# Patient Record
Sex: FEMALE | Race: WHITE | Employment: OTHER | URBAN - METROPOLITAN AREA
[De-identification: names, ages, dates, MRNs, and addresses within clinical notes are randomized per-mention and may not be internally consistent; named-entity substitution may affect disease eponyms.]

---

## 2017-02-13 RX ORDER — PRAVASTATIN SODIUM 10 MG/1
TABLET ORAL
Qty: 90 TAB | Refills: 1 | Status: SHIPPED | OUTPATIENT
Start: 2017-02-13 | End: 2017-05-16 | Stop reason: SDUPTHER

## 2017-05-08 RX ORDER — LEVOTHYROXINE SODIUM 75 UG/1
TABLET ORAL
Qty: 80 TAB | Refills: 1 | Status: SHIPPED | OUTPATIENT
Start: 2017-05-08 | End: 2017-08-08 | Stop reason: SDUPTHER

## 2017-05-16 RX ORDER — PRAVASTATIN SODIUM 10 MG/1
TABLET ORAL
Qty: 90 TAB | Refills: 1 | Status: SHIPPED | OUTPATIENT
Start: 2017-05-16 | End: 2017-12-05 | Stop reason: SDUPTHER

## 2017-06-07 ENCOUNTER — OFFICE VISIT (OUTPATIENT)
Dept: INTERNAL MEDICINE CLINIC | Age: 77
End: 2017-06-07

## 2017-06-07 ENCOUNTER — HOSPITAL ENCOUNTER (OUTPATIENT)
Dept: LAB | Age: 77
Discharge: HOME | End: 2017-06-07
Payer: MEDICARE

## 2017-06-07 VITALS
HEIGHT: 62 IN | SYSTOLIC BLOOD PRESSURE: 138 MMHG | HEART RATE: 63 BPM | WEIGHT: 112.25 LBS | TEMPERATURE: 98.1 F | RESPIRATION RATE: 18 BRPM | DIASTOLIC BLOOD PRESSURE: 83 MMHG | OXYGEN SATURATION: 98 % | BODY MASS INDEX: 20.66 KG/M2

## 2017-06-07 DIAGNOSIS — E03.4 HYPOTHYROIDISM DUE TO ACQUIRED ATROPHY OF THYROID: Primary | ICD-10-CM

## 2017-06-07 DIAGNOSIS — E78.5 HYPERLIPIDEMIA WITH TARGET LDL LESS THAN 130: ICD-10-CM

## 2017-06-07 PROCEDURE — 84443 ASSAY THYROID STIM HORMONE: CPT

## 2017-06-07 PROCEDURE — 36415 COLL VENOUS BLD VENIPUNCTURE: CPT

## 2017-06-07 NOTE — MR AVS SNAPSHOT
Visit Information Date & Time Provider Department Dept. Phone Encounter #  
 6/7/2017  8:45 AM Lynne Blake MD [facility] [address] [id number] Follow-up Instructions Return in about 6 months (around 12/7/2017). Upcoming Health Maintenance Date Due DTaP/Tdap/Td series (1 - Tdap) 6[birth year]6/1961 INFLUENZA AGE 9 TO ADULT 8/1/2017 GLAUCOMA SCREENING Q2Y 11/10/2017 MEDICARE YEARLY EXAM 12/7/2017 Allergies as of 6/7/2017  Review Complete On: 12/6/2016 By: Stephanie Olivarez LPN Severity Noted Reaction Type Reactions Aleve [Naproxen Sodium]  02/08/2013   Side Effect Other (comments) Welts on skin Iodine  11/04/2009    Other (comments) Loss of voice, burning sensations No IVP dye Pcn [Penicillins]  11/04/2009    Hives Sulfa (Sulfonamide Antibiotics)  11/04/2009    Other (comments) Bruising Tetracycline  07/29/2010   Intolerance Unknown (comments) Tylenol [Acetaminophen]  11/08/2013    Hives Hands were itchy and red. Current Immunizations  Reviewed on 12/8/2015 Name Date Influenza High Dose Vaccine PF 11/11/2016, 11/3/2015 Influenza Vaccine 11/13/2014, 11/5/2013 Influenza Vaccine (Whole Virus) 11/1/2012 Influenza Vaccine Split 10/15/2009 Pneumococcal Conjugate (PCV-13) 1/6/2017, 12/18/2015 Pneumococcal Vaccine (Unspecified Type) 11/8/2003, 1/30/2003 Zoster Vaccine, Live 11/8/2008 Not reviewed this visit You Were Diagnosed With   
  
 Codes Comments Hypothyroidism due to acquired atrophy of thyroid    -  Primary ICD-10-CM: E03.4 ICD-9-CM: 244.8, 246.8 Hyperlipidemia with target LDL less than 130     ICD-10-CM: E78.5 ICD-9-CM: 272.4 Vitals BP Pulse Temp Resp Height(growth percentile) Weight(growth percentile) 138/83 (BP 1 Location: Right arm, BP Patient Position: Sitting) 63 98.1 °F (36.7 °C) (Oral) 18 5' 2\" (1.575 m) 112 lb 4 oz (50.9 kg) LMP SpO2 BMI OB Status Smoking Status 01/01/1988 98% 20.53 kg/m2 Hysterectomy Never Smoker Vitals History BMI and BSA Data Body Mass Index Body Surface Area 20.53 kg/m 2 1.49 m 2 Preferred Pharmacy Pharmacy Name Phone [facility] [phone] [place] [phone] Your Updated Medication List  
  
   
This list is accurate as of: 6/7/17  9:08 AM.  Always use your most recent med list.  
  
  
  
  
 CALCIUM ACETATE PO Take  by mouth. cetirizine 10 mg tablet Commonly known as:  ZYRTEC Take 10 mg by mouth daily as needed. levothyroxine 75 mcg tablet Commonly known as:  SYNTHROID  
TAKE 1 TABLET BY MOUTH 6 DAYS A WEEK  
  
 pravastatin 10 mg tablet Commonly known as:  PRAVACHOL  
TAKE 1 TAB BY MOUTH DAILY. SYSTANE (PROPYLENE GLYCOL) 0.4-0.3 % Drop Generic drug:  peg 400-propylene glycol Apply 1 Drop to eye as needed. VITAMIN C 500 mg tablet Generic drug:  ascorbic acid (vitamin C) Take 500 mg by mouth. Taking half tablet daily VITAMIN D3 1,000 unit tablet Generic drug:  cholecalciferol Take  by mouth daily. We Performed the Following TSH 3RD GENERATION [47739 CPT(R)] Follow-up Instructions Return in about 6 months (around 12/7/2017). Introducing [facility]! [facility] introduces [employer]tech patient portal. Now you can access parts of your medical record, email your doctor's office, and request medication refills online. 1. In your internet browser, go to [email or web address] 2. Click on the First Time User? Click Here link in the Sign In box. You will see the New Member Sign Up page. 3. Enter your Buytech Access Code exactly as it appears below. You will not need to use this code after youve completed the sign-up process. If you do not sign up before the expiration date, you must request a new code. · Storspeed Access Code: [email or web address] Expires: 9/5/2017  9:08 AM 
 
4. Enter the last four digits of your Social Security Number (xxxx) and Date of Birth (mm/dd/yyyy) as indicated and click Submit. You will be taken to the next sign-up page. 5. Create a Storspeed ID. This will be your Storspeed login ID and cannot be changed, so think of one that is secure and easy to remember. 6. Create a Storspeed password. You can change your password at any time. 7. Enter your Password Reset Question and Answer. This can be used at a later time if you forget your password. 8. Enter your e-mail address. You will receive e-mail notification when new information is available in [address] 9. Click Sign Up. You can now view and download portions of your medical record. 10. Click the Download Summary menu link to download a portable copy of your medical information. If you have questions, please visit the Frequently Asked Questions section of the Storspeed website. Remember, Storspeed is NOT to be used for urgent needs. For medical emergencies, dial 911. Now available from your iPhone and Android! Please provide this summary of care documentation to your next provider. Your primary care clinician is listed as Vasyl Way. If you have any questions after today's visit, please call [phone].

## 2017-06-07 NOTE — PROGRESS NOTES
HISTORY OF PRESENT ILLNESS  Darek Torres is a 76 y.o. female. HPI  Thyroid Disease:  Darek Torres is a 76 y.o. female here for follow up of hypothyroidism. Lab Results   Component Value Date/Time    TSH 1.530 12/06/2016 09:43 AM     Residual symptoms denies fatigue, weight changes, heat/cold intolerance, bowel/skin changes or CVS symptoms. she denies denies fatigue, weight changes, heat/cold intolerance, bowel/skin changes or CVS symptoms  Thyroid medication has been unchanged since last medication check and labs. Hyperlipidemia:  Darek Torres is following up on her dyslipidemia. Cardiovascular risks for her are: LDL goal is under 100. Currently she takes Pravachol (pravastatin) ,   Lab Results   Component Value Date/Time    Cholesterol, total 160 12/06/2016 09:43 AM    Cholesterol, total 166 12/08/2015 09:23 AM    Cholesterol, total 200 12/04/2014 08:27 AM    Cholesterol, total 163 05/08/2014 08:17 AM    Cholesterol, total 140 11/08/2013 08:32 AM    HDL Cholesterol 51 12/06/2016 09:43 AM    HDL Cholesterol 51 12/08/2015 09:23 AM    HDL Cholesterol 49 12/04/2014 08:27 AM    HDL Cholesterol 48 05/08/2014 08:17 AM    HDL Cholesterol 46 11/08/2013 08:32 AM    LDL, calculated 90 12/06/2016 09:43 AM    LDL, calculated 102 12/08/2015 09:23 AM    LDL, calculated 123 12/04/2014 08:27 AM    LDL, calculated 96 05/08/2014 08:17 AM    LDL, calculated 72 11/08/2013 08:32 AM    Triglyceride 97 12/06/2016 09:43 AM    Triglyceride 63 12/08/2015 09:23 AM    Triglyceride 138 12/04/2014 08:27 AM    Triglyceride 97 05/08/2014 08:17 AM    Triglyceride 108 11/08/2013 08:32 AM    CHOL/HDL Ratio 4.8 05/05/2010 10:10 AM    CHOL/HDL Ratio 4.2 05/14/2009 08:40 AM     Lab Results   Component Value Date/Time    ALT (SGPT) 19 05/09/2013 12:00 AM    AST (SGOT) 20 05/09/2013 12:00 AM    Alk.  phosphatase 84 05/09/2013 12:00 AM    Bilirubin, direct 0.23 05/09/2013 12:00 AM    Bilirubin, total 1.0 05/09/2013 12:00 AM       Myalgias: no  Fatigue: no      She has seen Dr. Markos Ngo for recurring UTI's. Workup negative. Prescribed cranberry products which caused heartburn. She is taking tabs now without trouble. We discussed lack of clinical evidence behind this approach but ok to continue. Patient Active Problem List   Diagnosis Code    Hypothyroid E03.9    AR (allergic rhinitis) J30.9    Osteopenia M85.80    Colon polyp K63.5    Herpes simplex labialis B00.1    Hyperlipidemia with target LDL less than 130 E78.5    Anxiety F41.9    Osteoporosis M81.0    Dysplastic colon polyp K63.5    Advanced care planning/counseling discussion Z71.89    Abdominal pain R10.9     Past Medical History:   Diagnosis Date    AR (allergic rhinitis) 5/5/2010    Colon polyp 7/12/2010    Hyperlipidemia LDL goal < 130 2/6/2012    Hypothyroid 5/12/2009    Osteopenia 5/5/2010    Thyroid disease     Ureter filling defect     needed surgery     Allergies   Allergen Reactions    Aleve [Naproxen Sodium] Other (comments)     Welts on skin     Iodine Other (comments)     Loss of voice, burning sensations  No IVP dye     Pcn [Penicillins] Hives    Sulfa (Sulfonamide Antibiotics) Other (comments)     Bruising     Tetracycline Unknown (comments)    Tylenol [Acetaminophen] Hives     Hands were itchy and red. Current Outpatient Prescriptions on File Prior to Visit   Medication Sig Dispense Refill    pravastatin (PRAVACHOL) 10 mg tablet TAKE 1 TAB BY MOUTH DAILY. 90 Tab 1    levothyroxine (SYNTHROID) 75 mcg tablet TAKE 1 TABLET BY MOUTH 6 DAYS A WEEK 80 Tab 1    CALCIUM ACETATE PO Take  by mouth.  cetirizine (ZYRTEC) 10 mg tablet Take 10 mg by mouth daily as needed. 11    cholecalciferol (VITAMIN D3) 1,000 unit tablet Take  by mouth daily.  peg 400-propylene glycol (SYSTANE) 0.4-0.3 % Drop Apply 1 Drop to eye as needed.  ascorbic acid (VITAMIN C) 500 mg tablet Take 500 mg by mouth.  Taking half tablet daily       No current facility-administered medications on file prior to visit. Social History   Substance Use Topics    Smoking status: Never Smoker    Smokeless tobacco: Never Used    Alcohol use No             ROS    Physical Exam   Constitutional: She appears well-developed and well-nourished. No distress. /83 (BP 1 Location: Right arm, BP Patient Position: Sitting)  Pulse 63  Temp 98.1 °F (36.7 °C) (Oral)   Resp 18  Ht 5' 2\" (1.575 m)  Wt 112 lb 4 oz (50.9 kg)  LMP 01/01/1988  SpO2 98%  BMI 20.53 kg/m2Body mass index is 20.53 kg/(m^2). HENT:   Mouth/Throat: Oropharynx is clear and moist.   Neck: No JVD present. Carotid bruit is not present. Cardiovascular: Normal rate, regular rhythm, normal heart sounds and intact distal pulses. Pulmonary/Chest: Effort normal and breath sounds normal.   Musculoskeletal: She exhibits no edema. Neurological: She is alert. Skin: Skin is warm and dry. She is not diaphoretic. Nursing note and vitals reviewed. ASSESSMENT and PLAN  Meghan Galvan was seen today for thyroid problem. Diagnoses and all orders for this visit:    Hypothyroidism due to acquired atrophy of thyroid - Well controlled and stable. her medications were reviewed and refilled where necessary as noted below. Labs ordered as noted. -     TSH 3RD GENERATION    Hyperlipidemia with target LDL less than 130 -Well controlled and stable. her medications were reviewed and refilled where necessary as noted below. Labs ordered as noted. Recheck in 6 months. Follow-up Disposition:  Return in about 6 months (around 12/7/2017).

## 2017-06-08 LAB — TSH SERPL DL<=0.005 MIU/L-ACNC: 2.75 UIU/ML (ref 0.45–4.5)

## 2017-08-08 RX ORDER — LEVOTHYROXINE SODIUM 75 UG/1
TABLET ORAL
Qty: 80 TAB | Refills: 1 | Status: SHIPPED | OUTPATIENT
Start: 2017-08-08 | End: 2018-04-10 | Stop reason: SDUPTHER

## 2017-09-27 ENCOUNTER — OFFICE VISIT (OUTPATIENT)
Dept: INTERNAL MEDICINE CLINIC | Age: 77
End: 2017-09-27

## 2017-09-27 VITALS
HEIGHT: 62 IN | TEMPERATURE: 98 F | OXYGEN SATURATION: 99 % | RESPIRATION RATE: 12 BRPM | BODY MASS INDEX: 20.54 KG/M2 | WEIGHT: 111.6 LBS | SYSTOLIC BLOOD PRESSURE: 133 MMHG | DIASTOLIC BLOOD PRESSURE: 60 MMHG | HEART RATE: 65 BPM

## 2017-09-27 DIAGNOSIS — S69.92XA LEFT WRIST INJURY, INITIAL ENCOUNTER: Primary | ICD-10-CM

## 2017-09-27 DIAGNOSIS — M72.0 DUPUYTREN'S CONTRACTURE OF RIGHT HAND: ICD-10-CM

## 2017-09-27 DIAGNOSIS — S60.211A CONTUSION OF RIGHT WRIST, INITIAL ENCOUNTER: ICD-10-CM

## 2017-09-27 DIAGNOSIS — M81.0 AGE RELATED OSTEOPOROSIS, UNSPECIFIED PATHOLOGICAL FRACTURE PRESENCE: ICD-10-CM

## 2017-09-27 NOTE — PROGRESS NOTES
HISTORY OF PRESENT ILLNESS  Skyler Olivera is a 76 y.o. female. HPI  Presents with complaints of left wrist injury that occurred after she missed last step while descending her attic staircase on 9/21. [place] herself falling and she grabbed onto pole at end of steps and swung herself around, striking her left wrist.  Developed dark bruising and edema and she applied ice immediately after injury. Has developed persistent swelling over inner aspect of distal radius. Has been able to use her left hand with mild pain but has been mainly resting arm. History of Osteoporosis on DEXA scan in 2015. Also notes some thickening in palm of right hand at base of 5th finger that has been present for years. History of fracture to that finger when she was one. Has not noted any trouble flexing or extending right fifth finger. Review of Systems   Constitutional: Negative for chills, fever and malaise/fatigue. Respiratory: Negative for cough. Cardiovascular: Negative for chest pain and palpitations. Gastrointestinal: Negative for nausea and vomiting. Musculoskeletal: Positive for joint pain (left wrist). Neurological: Negative for dizziness and headaches. /60 (BP 1 Location: Right arm, BP Patient Position: Sitting)  Pulse 65  Temp 98 °F (36.7 °C) (Oral)   Resp 12  Ht 5' 2\" (1.575 m)  Wt 111 lb 9.6 oz (50.6 kg)  LMP 01/01/1988  SpO2 99%  BMI 20.41 kg/m2  Physical Exam   Constitutional: She is oriented to person, place, and time. She appears well-developed and well-nourished. HENT:   Head: Normocephalic and atraumatic. Cardiovascular: Normal rate and regular rhythm. Pulmonary/Chest: Effort normal and breath sounds normal.   Musculoskeletal:        Arms:       Hands:  Red area depicts contusion; black area soft tissue edema left wrist; right palm with small nodule at base of fifth finger with mild thickening of tendon    Neurological: She is alert and oriented to person, place, and time. Psychiatric: She has a normal mood and affect. Her behavior is normal.   Nursing note and vitals reviewed. ASSESSMENT and PLAN  Diagnoses and all orders for this visit:    1. Left wrist injury, initial encounter -- some soft tissue edema present; concern about possible bony injury due to history of Osteoporosis. Will obtain xray films  -     XR WRIST LT AP/LAT; Future    2. Contusion of right wrist, initial encounter    3. Age related osteoporosis, unspecified pathological fracture presence  -     XR WRIST LT AP/LAT; Future    4.  Dupuytren's contracture of right hand -- appears to be mild contracture; advised stretching and mild massage to area; advised her to contact office if she develops stiffness in right finger or difficulty with ROM      lab results and schedule of future lab studies reviewed with patient  reviewed diet, exercise and weight control  reviewed medications and side effects in detail

## 2017-09-27 NOTE — PATIENT INSTRUCTIONS
Dupuytren's Contracture: Care Instructions  Your Care Instructions    In Dupuytren's contracture, the fingers become stiff and curl toward the palm. It is caused by thick tissue that grows under the skin in the palm of the hand. Sometimes the condition affects the palm but not the fingers. If the tissue gets thicker and affects one or more fingers, it may limit movement of your fingers and hand. Sometimes the condition can occur in the soles of the feet. The cause of Dupuytren's disease is not known. Dupuytren's disease may get worse slowly. If you have mild Dupuytren's disease, you may be able to keep your fingers moving with regular stretching. Surgery usually helps in severe cases. However, Dupuytren's disease can come back. Follow-up care is a key part of your treatment and safety. Be sure to make and go to all appointments, and call your doctor if you are having problems. It's also a good idea to know your test results and keep a list of the medicines you take. How can you care for yourself at home? · Follow your doctor's advice for physical or occupational therapy and exercises to put your fingers and hand through a range of motion. · Two times a day, massage your hand and gently stretch the fingers back. This can get rid of tightness and help keep your fingers flexible. · Try to avoid curling your hand tightly. For example, use utensils and tools that have larger hand . When should you call for help? Call your doctor now or seek immediate medical care if:  · You have numbness in your fingers. · You have a wound or sore on your finger or palm. · Your hand or fingers get worse. Watch closely for changes in your health, and be sure to contact your doctor if you have any problems. Where can you learn more? Go to http://estephanie-magen.info/. Enter T888 in the search box to learn more about \"Dupuytren's Contracture: Care Instructions. \"  Current as of: March 21, 2017  Content Version: 11.3  © 7513-5593 [employer]. Care instructions adapted under license by [employer] (which disclaims liability or warranty for this information). If you have questions about a medical condition or this instruction, always ask your healthcare professional. [address] any warranty or liability for your use of this information.

## 2017-09-27 NOTE — MR AVS SNAPSHOT
Visit Information Date & Time Provider Department Dept. Phone Encounter #  
 9/27/2017  9:45 AM Darcy Gerber MD [facility] [address] [facility] [id number] Your Appointments 12/5/2017  8:00 AM  
ROUTINE CARE with Darcy Gerber MD  
[facility] [place] Melissa Maldonado) Appt Note: 6 month for thyroid eval and fasting labs [address] [facility] [id number] [phone]  
  
   
 [address] [facility] [address] Upcoming Health Maintenance Date Due DTaP/Tdap/Td series (1 - Tdap) 6/6/1961 INFLUENZA AGE 9 TO ADULT 8/1/2017 GLAUCOMA SCREENING Q2Y 11/10/2017 MEDICARE YEARLY EXAM 12/7/2017 Allergies as of 9/27/2017  Review Complete On: 9/27/2017 By: Dede Dwyer NP Severity Noted Reaction Type Reactions Aleve [Naproxen Sodium]  02/08/2013   Side Effect Other (comments) Welts on skin Iodine  11/04/2009    Other (comments) Loss of voice, burning sensations No IVP dye Pcn [Penicillins]  11/04/2009    Hives Sulfa (Sulfonamide Antibiotics)  11/04/2009    Other (comments) Bruising Tetracycline  07/29/2010   Intolerance Unknown (comments) Tylenol [Acetaminophen]  11/08/2013    Hives Hands were itchy and red. Current Immunizations  Reviewed on 12/8/2015 Name Date Influenza High Dose Vaccine PF 11/11/2016, 11/3/2015 Influenza Vaccine 11/13/2014, 11/5/2013 Influenza Vaccine (Whole Virus) 11/1/2012 Influenza Vaccine Split 10/15/2009 Pneumococcal Conjugate (PCV-13) 1/6/2017, 12/18/2015 Pneumococcal Vaccine (Unspecified Type) 11/8/2003 ZZZ-RETIRED (DO NOT USE) Pneumococcal Vaccine (Unspecified Type) 1/30/2003 Zoster Vaccine, Live 11/8/2008 Not reviewed this visit You Were Diagnosed With   
  
 Codes Comments Left wrist injury, initial encounter    -  Primary ICD-10-CM: M25.29PA ICD-9-CM: 959.3 Age related osteoporosis, unspecified pathological fracture presence     ICD-10-CM: M81.0 ICD-9-CM: 733.01 Dupuytren's contracture of right hand     ICD-10-CM: M72.0 ICD-9-CM: 728.6 Contusion of right wrist, initial encounter     ICD-10-CM: Q96.926F ICD-9-CM: 923.21 Vitals BP Pulse Temp Resp Height(growth percentile) Weight(growth percentile) 133/60 (BP 1 Location: Right arm, BP Patient Position: Sitting) 65 98 °F (36.7 °C) (Oral) 12 5' 2\" (1.575 m) 111 lb 9.6 oz (50.6 kg) LMP SpO2 BMI OB Status Smoking Status 01/01/1988 99% 20.41 kg/m2 Hysterectomy Never Smoker BMI and BSA Data Body Mass Index Body Surface Area  
 20.41 kg/m 2 1.49 m 2 Preferred Pharmacy Pharmacy Name Phone [phone] [facility] [phone] Your Updated Medication List  
  
   
This list is accurate as of: 9/27/17  9:58 AM.  Always use your most recent med list.  
  
  
  
  
 BABY ASPIRIN PO Take  by mouth. CALCIUM ACETATE PO Take  by mouth. cetirizine 10 mg tablet Commonly known as:  ZYRTEC Take 10 mg by mouth daily as needed. levothyroxine 75 mcg tablet Commonly known as:  SYNTHROID  
TAKE 1 TABLET BY MOUTH 6 DAYS A WEEK  
  
 pravastatin 10 mg tablet Commonly known as:  PRAVACHOL  
TAKE 1 TAB BY MOUTH DAILY. SYSTANE (PROPYLENE GLYCOL) 0.4-0.3 % Drop Generic drug:  peg 400-propylene glycol Apply 1 Drop to eye as needed. VITAMIN C 500 mg tablet Generic drug:  ascorbic acid (vitamin C) Take 500 mg by mouth. Taking half tablet daily VITAMIN D3 1,000 unit tablet Generic drug:  cholecalciferol Take  by mouth daily. To-Do List   
 09/27/2017 Imaging:  XR WRIST LT AP/LAT Patient Instructions Dupuytren's Contracture: Care Instructions Your Care Instructions In Dupuytren's contracture, the fingers become stiff and curl toward the palm. It is caused by thick tissue that grows under the skin in the palm of the hand. Sometimes the condition affects the palm but not the fingers. If the tissue gets thicker and affects one or more fingers, it may limit movement of your fingers and hand. Sometimes the condition can occur in the soles of the feet. The cause of Dupuytren's disease is not known. Dupuytren's disease may get worse slowly. If you have mild Dupuytren's disease, you may be able to keep your fingers moving with regular stretching. Surgery usually helps in severe cases. However, Dupuytren's disease can come back. Follow-up care is a key part of your treatment and safety. Be sure to make and go to all appointments, and call your doctor if you are having problems. It's also a good idea to know your test results and keep a list of the medicines you take. How can you care for yourself at home? · Follow your doctor's advice for physical or occupational therapy and exercises to put your fingers and hand through a range of motion. · Two times a day, massage your hand and gently stretch the fingers back. This can get rid of tightness and help keep your fingers flexible. · Try to avoid curling your hand tightly. For example, use utensils and tools that have larger hand . When should you call for help? Call your doctor now or seek immediate medical care if: 
· You have numbness in your fingers. · You have a wound or sore on your finger or palm. · Your hand or fingers get worse. Watch closely for changes in your health, and be sure to contact your doctor if you have any problems. Where can you learn more? Go to http://estephanie-magen.info/. Enter T888 in the search box to learn more about \"Dupuytren's Contracture: Care Instructions. \" Current as of: March 21, 2017 Content Version: 11.3 © 7481-3431 [employer].  Care instructions adapted under license by [address] (which disclaims liability or warranty for this information). If you have questions about a medical condition or this instruction, always ask your healthcare professional. [address] any warranty or liability for your use of this information. Introducing [facility]! Errol Owens introduces [employer] patient portal. Now you can access parts of your medical record, email your doctor's office, and request medication refills online. 1. In your internet browser, go to [email or web address] 2. Click on the First Time User? Click Here link in the Sign In box. You will see the New Member Sign Up page. 3. Enter your RunRev Access Code exactly as it appears below. You will not need to use this code after youve completed the sign-up process. If you do not sign up before the expiration date, you must request a new code. · RunRev Access Code: [email or web address] Expires: 12/26/2017  9:56 AM 
 
4. Enter the last four digits of your Social Security Number (xxxx) and Date of Birth (mm/dd/yyyy) as indicated and click Submit. You will be taken to the next sign-up page. 5. Create a RunRev ID. This will be your RunRev login ID and cannot be changed, so think of one that is secure and easy to remember. 6. Create a [employer]Rev password. You can change your password at any time. 7. Enter your Password Reset Question and Answer. This can be used at a later time if you forget your password. 8. Enter your e-mail address. You will receive e-mail notification when new information is available in [address] 9. Click Sign Up. You can now view and download portions of your medical record. 10. Click the Download Summary menu link to download a portable copy of your medical information. If you have questions, please visit the Frequently Asked Questions section of the [employer] website.  Remember, [employer] is NOT to be used for urgent needs. For medical emergencies, dial 911. Now available from your iPhone and Android! Please provide this summary of care documentation to your next provider. Your primary care clinician is listed as Adithya Martinez. If you have any questions after today's visit, please call [phone].

## 2017-10-01 ENCOUNTER — HOSPITAL ENCOUNTER (OUTPATIENT)
Dept: GENERAL RADIOLOGY | Age: 77
Discharge: HOME | End: 2017-10-01
Payer: MEDICARE

## 2017-10-01 DIAGNOSIS — M81.0 AGE RELATED OSTEOPOROSIS, UNSPECIFIED PATHOLOGICAL FRACTURE PRESENCE: ICD-10-CM

## 2017-10-01 DIAGNOSIS — S69.92XA LEFT WRIST INJURY, INITIAL ENCOUNTER: ICD-10-CM

## 2017-10-01 PROCEDURE — 73110 X-RAY EXAM OF WRIST: CPT

## 2017-10-26 ENCOUNTER — TELEPHONE (OUTPATIENT)
Dept: INTERNAL MEDICINE CLINIC | Age: 77
End: 2017-10-26

## 2017-12-05 ENCOUNTER — HOSPITAL ENCOUNTER (OUTPATIENT)
Dept: LAB | Age: 77
Discharge: HOME | End: 2017-12-05
Payer: MEDICARE

## 2017-12-05 ENCOUNTER — OFFICE VISIT (OUTPATIENT)
Dept: INTERNAL MEDICINE CLINIC | Age: 77
End: 2017-12-05

## 2017-12-05 VITALS
HEART RATE: 82 BPM | OXYGEN SATURATION: 97 % | RESPIRATION RATE: 17 BRPM | TEMPERATURE: 98 F | WEIGHT: 109 LBS | BODY MASS INDEX: 20.06 KG/M2 | HEIGHT: 62 IN | SYSTOLIC BLOOD PRESSURE: 148 MMHG | DIASTOLIC BLOOD PRESSURE: 76 MMHG

## 2017-12-05 DIAGNOSIS — E03.4 HYPOTHYROIDISM DUE TO ACQUIRED ATROPHY OF THYROID: ICD-10-CM

## 2017-12-05 DIAGNOSIS — J06.9 VIRAL UPPER RESPIRATORY TRACT INFECTION: ICD-10-CM

## 2017-12-05 DIAGNOSIS — E78.5 HYPERLIPIDEMIA WITH TARGET LDL LESS THAN 130: Primary | ICD-10-CM

## 2017-12-05 PROCEDURE — 36415 COLL VENOUS BLD VENIPUNCTURE: CPT

## 2017-12-05 PROCEDURE — 84443 ASSAY THYROID STIM HORMONE: CPT

## 2017-12-05 PROCEDURE — 80061 LIPID PANEL: CPT

## 2017-12-05 RX ORDER — PRAVASTATIN SODIUM 10 MG/1
TABLET ORAL
Qty: 90 TAB | Refills: 1 | Status: SHIPPED | OUTPATIENT
Start: 2017-12-05 | End: 2018-08-07 | Stop reason: SDUPTHER

## 2017-12-05 NOTE — PROGRESS NOTES
HISTORY OF PRESENT ILLNESS  Haley Dunn is a 76 y.o. female. HPI  Upper respiratory illness:  Haley Dunn presents with complaints of coryza and productive cough for 6 days. no nausea and no vomiting . she has not had  congestion, sore throat, post nasal drip, night sweats, fever and chills. Symptoms are mild. Over-the-counter remedies including robitussin DM   has been used with good relief of symptoms. Drinking plenty of fluids: yes  Asthma?:  no  non-smoker  Contacts with similar infections: no       Hyperlipidemia:  Haley Dunn is following up on her dyslipidemia. Cardiovascular risks for her are: LDL goal is under 100. Currently she takes Pravachol (pravastatin) ,   Lab Results   Component Value Date/Time    Cholesterol, total 160 12/06/2016 09:43 AM    Cholesterol, total 166 12/08/2015 09:23 AM    Cholesterol, total 200 12/04/2014 08:27 AM    Cholesterol, total 163 05/08/2014 08:17 AM    Cholesterol, total 140 11/08/2013 08:32 AM    HDL Cholesterol 51 12/06/2016 09:43 AM    HDL Cholesterol 51 12/08/2015 09:23 AM    HDL Cholesterol 49 12/04/2014 08:27 AM    HDL Cholesterol 48 05/08/2014 08:17 AM    HDL Cholesterol 46 11/08/2013 08:32 AM    LDL, calculated 90 12/06/2016 09:43 AM    LDL, calculated 102 12/08/2015 09:23 AM    LDL, calculated 123 12/04/2014 08:27 AM    LDL, calculated 96 05/08/2014 08:17 AM    LDL, calculated 72 11/08/2013 08:32 AM    Triglyceride 97 12/06/2016 09:43 AM    Triglyceride 63 12/08/2015 09:23 AM    Triglyceride 138 12/04/2014 08:27 AM    Triglyceride 97 05/08/2014 08:17 AM    Triglyceride 108 11/08/2013 08:32 AM    CHOL/HDL Ratio 4.8 05/05/2010 10:10 AM    CHOL/HDL Ratio 4.2 05/14/2009 08:40 AM     Lab Results   Component Value Date/Time    ALT (SGPT) 19 05/09/2013 12:00 AM    AST (SGOT) 20 05/09/2013 12:00 AM    Alk.  phosphatase 84 05/09/2013 12:00 AM    Bilirubin, direct 0.23 05/09/2013 12:00 AM    Bilirubin, total 1.0 05/09/2013 12:00 AM       Myalgias: no  Fatigue: no        Thyroid Disease:  Kinjal Price is a 76 y.o. female here for follow up of hypothyroidism. Lab Results   Component Value Date/Time    TSH 2.750 06/07/2017 09:22 AM     Residual symptoms constipation. she denies denies fatigue, weight changes, heat/cold intolerance, /skin changes or CVS symptoms  Thyroid medication has been unchanged since last medication check and labs. Patient Active Problem List   Diagnosis Code    Hypothyroid E03.9    AR (allergic rhinitis) J30.9    Osteopenia M85.80    Colon polyp K63.5    Herpes simplex labialis B00.1    Hyperlipidemia with target LDL less than 130 E78.5    Anxiety F41.9    Osteoporosis M81.0    Dysplastic colon polyp K63.5    Advanced care planning/counseling discussion Z71.89    Abdominal pain R10.9     Past Medical History:   Diagnosis Date    AR (allergic rhinitis) 5/5/2010    Colon polyp 7/12/2010    Hyperlipidemia LDL goal < 130 2/6/2012    Hypothyroid 5/12/2009    Osteopenia 5/5/2010    Thyroid disease     Ureter filling defect     needed surgery     Allergies   Allergen Reactions    Aleve [Naproxen Sodium] Other (comments)     Welts on skin     Iodine Other (comments)     Loss of voice, burning sensations  No IVP dye     Pcn [Penicillins] Hives    Sulfa (Sulfonamide Antibiotics) Other (comments)     Bruising     Tetracycline Unknown (comments)    Tylenol [Acetaminophen] Hives     Hands were itchy and red. Current Outpatient Prescriptions on File Prior to Visit   Medication Sig Dispense Refill    BABY ASPIRIN PO Take  by mouth.  levothyroxine (SYNTHROID) 75 mcg tablet TAKE 1 TABLET BY MOUTH 6 DAYS A WEEK 80 Tab 1    CALCIUM ACETATE PO Take  by mouth.  cetirizine (ZYRTEC) 10 mg tablet Take 10 mg by mouth daily as needed. 11    cholecalciferol (VITAMIN D3) 1,000 unit tablet Take  by mouth daily.  peg 400-propylene glycol (SYSTANE) 0.4-0.3 % Drop Apply 1 Drop to eye as needed.       ascorbic acid (VITAMIN C) 500 mg tablet Take 500 mg by mouth. Taking half tablet daily       No current facility-administered medications on file prior to visit. Social History   Substance Use Topics    Smoking status: Never Smoker    Smokeless tobacco: Never Used    Alcohol use No         ROS    Physical Exam   Constitutional: She appears well-developed and well-nourished. No distress. /76 (BP 1 Location: Left arm, BP Patient Position: Sitting)  Pulse 82  Temp 98 °F (36.7 °C) (Oral)   Resp 17  Ht 5' 2\" (1.575 m)  Wt 109 lb (49.4 kg)  LMP 01/01/1988  SpO2 97%  BMI 19.94 kg/m2Body mass index is 19.94 kg/(m^2). HENT:   Nose: Mucosal edema and rhinorrhea present. Mouth/Throat: Oropharynx is clear and moist. No oropharyngeal exudate, posterior oropharyngeal edema or posterior oropharyngeal erythema. Neck: No JVD present. Carotid bruit is not present. Cardiovascular: Normal rate, regular rhythm, normal heart sounds and intact distal pulses. Pulmonary/Chest: Effort normal and breath sounds normal. No accessory muscle usage. No respiratory distress. She has no decreased breath sounds. She has no wheezes. She has no rhonchi. She has no rales. Musculoskeletal: She exhibits no edema. Neurological: She is alert. Skin: Skin is warm and dry. She is not diaphoretic. Nursing note and vitals reviewed. ASSESSMENT and PLAN  Diagnoses and all orders for this visit:    1. Hyperlipidemia with target LDL less than 130 - Well controlled and stable. her medications were reviewed and refilled where necessary as noted below. Labs ordered as noted. -     LIPID PANEL  -     pravastatin (PRAVACHOL) 10 mg tablet; TAKE 1 TAB BY MOUTH DAILY. 2. Viral upper respiratory tract infection - Camron Galeana was diagnosed with a viral upper respiratory infection.   she is advised to drink plenty of water, use shower steam or humidifier to loosen secretions, saline nasal lavage 3 times daily and get plenty of rest.  she may use mucinex 1200mg twice daily along with tylenol or advil as needed for fever and pain. Written instructions were given to the patient emphasizing these recommendations. 3. Hypothyroidism due to acquired atrophy of thyroid - Well controlled and stable. her medications were reviewed and refilled where necessary as noted below. Labs ordered as noted. -     TSH 3RD GENERATION      Follow-up Disposition:  Return in about 6 months (around 6/5/2018).

## 2017-12-05 NOTE — MR AVS SNAPSHOT
Visit Information Date & Time Provider Department Dept. Phone Encounter #  
 12/5/2017  8:00 AM Bisi Allen MD [facility] [address] [facility] [id number] Follow-up Instructions Return in about 6 months (around 6/5/2018). Upcoming Health Maintenance Date Due DTaP/Tdap/Td series (1 - Tdap) 6/6/1961 GLAUCOMA SCREENING Q2Y 11/10/2017 MEDICARE YEARLY EXAM 12/7/2017 Allergies as of 12/5/2017  Review Complete On: 12/5/2017 By: Yanni Becerra LPN Severity Noted Reaction Type Reactions Aleve [Naproxen Sodium]  02/08/2013   Side Effect Other (comments) Welts on skin Iodine  11/04/2009    Other (comments) Loss of voice, burning sensations No IVP dye Pcn [Penicillins]  11/04/2009    Hives Sulfa (Sulfonamide Antibiotics)  11/04/2009    Other (comments) Bruising Tetracycline  07/29/2010   Intolerance Unknown (comments) Tylenol [Acetaminophen]  11/08/2013    Hives Hands were itchy and red. Current Immunizations  Reviewed on 9/27/2017 Name Date Influenza High Dose Vaccine PF 11/17/2017, 11/11/2016, 11/3/2015 Influenza Vaccine 11/13/2014, 11/5/2013 Influenza Vaccine (Whole Virus) 11/1/2012 Influenza Vaccine Split 10/15/2009 Pneumococcal Conjugate (PCV-13) 1/6/2017, 12/18/2015 Pneumococcal Vaccine (Unspecified Type) 11/8/2003 ZZZ-RETIRED (DO NOT USE) Pneumococcal Vaccine (Unspecified Type) 1/30/2003 Zoster Vaccine, Live 11/8/2008 Not reviewed this visit You Were Diagnosed With   
  
 Codes Comments Hyperlipidemia with target LDL less than 130    -  Primary ICD-10-CM: E78.5 ICD-9-CM: 272.4 Viral upper respiratory tract infection     ICD-10-CM: J06.9, B97.89 ICD-9-CM: 465.9 Hypothyroidism due to acquired atrophy of thyroid     ICD-10-CM: E03.4 ICD-9-CM: 244.8, 246.8 Vitals BP Pulse Temp Resp Height(growth percentile) Weight(growth percentile) 148/76 (BP 1 Location: Left arm, BP Patient Position: Sitting) 82 98 °F (36.7 °C) (Oral) 17 5' 2\" (1.575 m) 109 lb (49.4 kg) LMP SpO2 BMI OB Status Smoking Status 01/01/1988 97% 19.94 kg/m2 Hysterectomy Never Smoker Vitals History BMI and BSA Data Body Mass Index Body Surface Area 19.94 kg/m 2 1.47 m 2 Preferred Pharmacy Pharmacy Name Phone [facility] [address] [phone] Your Updated Medication List  
  
   
This list is accurate as of: 12/5/17  8:17 AM.  Always use your most recent med list.  
  
  
  
  
 BABY ASPIRIN PO Take  by mouth. CALCIUM ACETATE PO Take  by mouth. cetirizine 10 mg tablet Commonly known as:  ZYRTEC Take 10 mg by mouth daily as needed. FLUZONE HIGH-DOSE 2017-18 (PF) Syrg injection Generic drug:  influenza vaccine 2017-18 (65 yrs+)(PF)  
  
 levothyroxine 75 mcg tablet Commonly known as:  SYNTHROID  
TAKE 1 TABLET BY MOUTH 6 DAYS A WEEK  
  
 pravastatin 10 mg tablet Commonly known as:  PRAVACHOL  
TAKE 1 TAB BY MOUTH DAILY. SYSTANE (PROPYLENE GLYCOL) 0.4-0.3 % Drop Generic drug:  peg 400-propylene glycol Apply 1 Drop to eye as needed. VITAMIN C 500 mg tablet Generic drug:  ascorbic acid (vitamin C) Take 500 mg by mouth. Taking half tablet daily VITAMIN D3 1,000 unit tablet Generic drug:  cholecalciferol Take  by mouth daily. Prescriptions Sent to Pharmacy Refills  
 pravastatin (PRAVACHOL) 10 mg tablet 1 Sig: TAKE 1 TAB BY MOUTH DAILY. Class: Normal  
 Pharmacy: [place], [place] - [address] [place] [address] [facility] Ph #: [phone] We Performed the Following LIPID PANEL [70201 CPT(R)] TSH 3RD GENERATION [41517 CPT(R)] Follow-up Instructions Return in about 6 months (around 6/5/2018). Patient Instructions Upper Respiratory Infection (Cold): Care Instructions Your Care Instructions An upper respiratory infection, or URI, is an infection of the nose, sinuses, or throat. URIs are spread by coughs, sneezes, and direct contact. The common cold is the most frequent kind of URI. The flu and sinus infections are other kinds of URIs. Almost all URIs are caused by viruses. Antibiotics won't cure them. But you can treat most infections with home care. This may include drinking lots of fluids and taking over-the-counter pain medicine. You will probably feel better in 4 to 10 days. The doctor has checked you carefully, but problems can develop later. If you notice any problems or new symptoms, get medical treatment right away. Follow-up care is a key part of your treatment and safety. Be sure to make and go to all appointments, and call your doctor if you are having problems. It's also a good idea to know your test results and keep a list of the medicines you take. How can you care for yourself at home? · To prevent dehydration, drink plenty of fluids, enough so that your urine is light yellow or clear like water. Choose water and other caffeine-free clear liquids until you feel better. If you have kidney, heart, or liver disease and have to limit fluids, talk with your doctor before you increase the amount of fluids you drink. · Take an over-the-counter pain medicine, such as acetaminophen (Tylenol), ibuprofen (Advil, Motrin), or naproxen (Aleve). Read and follow all instructions on the label. · Before you use cough and cold medicines, check the label. These medicines may not be safe for young children or for people with certain health problems. · Be careful when taking over-the-counter cold or flu medicines and Tylenol at the same time. Many of these medicines have acetaminophen, which is Tylenol. Read the labels to make sure that you are not taking more than the recommended dose. Too much acetaminophen (Tylenol) can be harmful.  
· Get plenty of rest. 
 · Do not smoke or allow others to smoke around you. If you need help quitting, talk to your doctor about stop-smoking programs and medicines. These can increase your chances of quitting for good. When should you call for help? Call 911 anytime you think you may need emergency care. For example, call if: 
? · You have severe trouble breathing. ?Call your doctor now or seek immediate medical care if: 
? · You seem to be getting much sicker. ? · You have new or worse trouble breathing. ? · You have a new or higher fever. ? · You have a new rash. ? Watch closely for changes in your health, and be sure to contact your doctor if: 
? · You have a new symptom, such as a sore throat, an earache, or sinus pain. ? · You cough more deeply or more often, especially if you notice more mucus or a change in the color of your mucus. ? · You do not get better as expected. Where can you learn more? Go to http://estephanie-magen.info/. Enter Z373 in the search box to learn more about \"Upper Respiratory Infection (Cold): Care Instructions. \" Current as of: May 12, 2017 Content Version: 11.4 © 0367-4678 [employer]. Care instructions adapted under license by [employer] (which disclaims liability or warranty for this information). If you have questions about a medical condition or this instruction, always ask your healthcare professional. [place] [address] any warranty or liability for your use of this information. Introducing [facility]! [facility] introduces [employer] patient portal. Now you can access parts of your medical record, email your doctor's office, and request medication refills online. 1. In your internet browser, go to [email or web address]"[email or web address]". [email or web address]/"[employer]" 2. Click on the First Time User? Click Here link in the Sign In box. You will see the New Member Sign Up page. 3. Enter your Noomeo Access Code exactly as it appears below. You will not need to use this code after youve completed the sign-up process. If you do not sign up before the expiration date, you must request a new code. · Noomeo Access Code: [email or web address] Expires: 12/26/2017  8:56 AM 
 
4. Enter the last four digits of your Social Security Number (xxxx) and Date of Birth (mm/dd/yyyy) as indicated and click Submit. You will be taken to the next sign-up page. 5. Create a Noomeo ID. This will be your Noomeo login ID and cannot be changed, so think of one that is secure and easy to remember. 6. Create a Noomeo password. You can change your password at any time. 7. Enter your Password Reset Question and Answer. This can be used at a later time if you forget your password. 8. Enter your e-mail address. You will receive e-mail notification when new information is available in [address]. 9. Click Sign Up. You can now view and download portions of your medical record. 10. Click the Download Summary menu link to download a portable copy of your medical information. If you have questions, please visit the Frequently Asked Questions section of the Noomeo website. Remember, Noomeo is NOT to be used for urgent needs. For medical emergencies, dial 911. Now available from your iPhone and Android! Please provide this summary of care documentation to your next provider. Your primary care clinician is listed as Raudel Hamilton. If you have any questions after today's visit, please call [phone].

## 2017-12-05 NOTE — PATIENT INSTRUCTIONS
Upper Respiratory Infection (Cold): Care Instructions  Your Care Instructions    An upper respiratory infection, or URI, is an infection of the nose, sinuses, or throat. URIs are spread by coughs, sneezes, and direct contact. The common cold is the most frequent kind of URI. The flu and sinus infections are other kinds of URIs. Almost all URIs are caused by viruses. Antibiotics won't cure them. But you can treat most infections with home care. This may include drinking lots of fluids and taking over-the-counter pain medicine. You will probably feel better in 4 to 10 days. The doctor has checked you carefully, but problems can develop later. If you notice any problems or new symptoms, get medical treatment right away. Follow-up care is a key part of your treatment and safety. Be sure to make and go to all appointments, and call your doctor if you are having problems. It's also a good idea to know your test results and keep a list of the medicines you take. How can you care for yourself at home? · To prevent dehydration, drink plenty of fluids, enough so that your urine is light yellow or clear like water. Choose water and other caffeine-free clear liquids until you feel better. If you have kidney, heart, or liver disease and have to limit fluids, talk with your doctor before you increase the amount of fluids you drink. · Take an over-the-counter pain medicine, such as acetaminophen (Tylenol), ibuprofen (Advil, Motrin), or naproxen (Aleve). Read and follow all instructions on the label. · Before you use cough and cold medicines, check the label. These medicines may not be safe for young children or for people with certain health problems. · Be careful when taking over-the-counter cold or flu medicines and Tylenol at the same time. Many of these medicines have acetaminophen, which is Tylenol. Read the labels to make sure that you are not taking more than the recommended dose.  Too much acetaminophen (Tylenol) can be harmful. · Get plenty of rest.  · Do not smoke or allow others to smoke around you. If you need help quitting, talk to your doctor about stop-smoking programs and medicines. These can increase your chances of quitting for good. When should you call for help? Call 911 anytime you think you may need emergency care. For example, call if:  ? · You have severe trouble breathing. ?Call your doctor now or seek immediate medical care if:  ? · You seem to be getting much sicker. ? · You have new or worse trouble breathing. ? · You have a new or higher fever. ? · You have a new rash. ? Watch closely for changes in your health, and be sure to contact your doctor if:  ? · You have a new symptom, such as a sore throat, an earache, or sinus pain. ? · You cough more deeply or more often, especially if you notice more mucus or a change in the color of your mucus. ? · You do not get better as expected. Where can you learn more? Go to http://estephanie-magen.info/. Enter G321 in the search box to learn more about \"Upper Respiratory Infection (Cold): Care Instructions. \"  Current as of: May 12, 2017  Content Version: 11.4  © 2646-4271 [employer]. Care instructions adapted under license by [employer] (which disclaims liability or warranty for this information). If you have questions about a medical condition or this instruction, always ask your healthcare professional. [place] [address] any warranty or liability for your use of this information.

## 2017-12-06 LAB
CHOLEST SERPL-MCNC: 139 MG/DL (ref 100–199)
HDLC SERPL-MCNC: 37 MG/DL
INTERPRETATION, 910389: NORMAL
LDLC SERPL CALC-MCNC: 84 MG/DL (ref 0–99)
TRIGL SERPL-MCNC: 92 MG/DL (ref 0–149)
TSH SERPL DL<=0.005 MIU/L-ACNC: 1.37 UIU/ML (ref 0.45–4.5)
VLDLC SERPL CALC-MCNC: 18 MG/DL (ref 5–40)

## 2018-04-10 RX ORDER — LEVOTHYROXINE SODIUM 75 UG/1
TABLET ORAL
Qty: 80 TAB | Refills: 2 | Status: SHIPPED | OUTPATIENT
Start: 2018-04-10 | End: 2018-10-09 | Stop reason: SDUPTHER

## 2018-06-12 ENCOUNTER — OFFICE VISIT (OUTPATIENT)
Dept: INTERNAL MEDICINE CLINIC | Age: 78
End: 2018-06-12

## 2018-06-12 ENCOUNTER — HOSPITAL ENCOUNTER (OUTPATIENT)
Dept: LAB | Age: 78
Discharge: HOME | End: 2018-06-12
Payer: MEDICARE

## 2018-06-12 VITALS
DIASTOLIC BLOOD PRESSURE: 78 MMHG | RESPIRATION RATE: 18 BRPM | HEART RATE: 57 BPM | BODY MASS INDEX: 19.55 KG/M2 | OXYGEN SATURATION: 98 % | TEMPERATURE: 97.7 F | WEIGHT: 106.25 LBS | HEIGHT: 62 IN | SYSTOLIC BLOOD PRESSURE: 142 MMHG

## 2018-06-12 DIAGNOSIS — Z71.89 ADVANCED CARE PLANNING/COUNSELING DISCUSSION: ICD-10-CM

## 2018-06-12 DIAGNOSIS — E03.4 HYPOTHYROIDISM DUE TO ACQUIRED ATROPHY OF THYROID: ICD-10-CM

## 2018-06-12 DIAGNOSIS — Z23 ENCOUNTER FOR IMMUNIZATION: ICD-10-CM

## 2018-06-12 DIAGNOSIS — E55.9 VITAMIN D DEFICIENCY: ICD-10-CM

## 2018-06-12 DIAGNOSIS — M81.0 AGE RELATED OSTEOPOROSIS, UNSPECIFIED PATHOLOGICAL FRACTURE PRESENCE: ICD-10-CM

## 2018-06-12 DIAGNOSIS — Z13.31 SCREENING FOR DEPRESSION: ICD-10-CM

## 2018-06-12 DIAGNOSIS — Z00.00 MEDICARE ANNUAL WELLNESS VISIT, SUBSEQUENT: Primary | ICD-10-CM

## 2018-06-12 DIAGNOSIS — E78.5 HYPERLIPIDEMIA WITH TARGET LDL LESS THAN 130: ICD-10-CM

## 2018-06-12 DIAGNOSIS — G25.3 MYOCLONIC JERKING: ICD-10-CM

## 2018-06-12 PROCEDURE — 36415 COLL VENOUS BLD VENIPUNCTURE: CPT

## 2018-06-12 PROCEDURE — 84443 ASSAY THYROID STIM HORMONE: CPT

## 2018-06-12 PROCEDURE — 80061 LIPID PANEL: CPT

## 2018-06-12 PROCEDURE — 82306 VITAMIN D 25 HYDROXY: CPT

## 2018-06-12 NOTE — PROGRESS NOTES
Nurse Navigator Medicare Wellness Visit performed by ABENA Shabazz RN    This is the Subsequent Medicare Annual Wellness Exam, performed 12 months or more after the Initial AWV or the last Subsequent AWV    I have reviewed the patient's medical history in detail and updated the computerized patient record. History     Past Medical History:   Diagnosis Date    AR (allergic rhinitis) 5/5/2010    Colon polyp 7/12/2010    Hyperlipidemia LDL goal < 130 2/6/2012    Hypothyroid 5/12/2009    Osteopenia 5/5/2010    Thyroid disease     Ureter filling defect     needed surgery      Past Surgical History:   Procedure Laterality Date    HX COLECTOMY  10/2010    right, due to tumor, tubular adenoma, Dr. Myers Prom HX UROLOGICAL      ureteral transplant     Current Outpatient Prescriptions   Medication Sig Dispense Refill    denosumab (PROLIA) 60 mg/mL injection 1 mL by SubCUTAneous route once for 1 dose. Indications: POST-MENOPAUSAL OSTEOPOROSIS 1 mL 0    varicella-zoster recombinant, PF, (SHINGRIX, PF,) 50 mcg/0.5 mL susr injection 0.5 mL by IntraMUSCular route once for 1 dose. 0.5 mL 0    levothyroxine (SYNTHROID) 75 mcg tablet TAKE 1 TABLET BY MOUTH 6 DAYS A WEEK 80 Tab 2    pravastatin (PRAVACHOL) 10 mg tablet TAKE 1 TAB BY MOUTH DAILY. 90 Tab 1    BABY ASPIRIN PO Take  by mouth.  CALCIUM ACETATE PO Take  by mouth.  cetirizine (ZYRTEC) 10 mg tablet Take 10 mg by mouth daily as needed. 11    cholecalciferol (VITAMIN D3) 1,000 unit tablet Take  by mouth daily.  peg 400-propylene glycol (SYSTANE) 0.4-0.3 % Drop Apply 1 Drop to eye as needed.  ascorbic acid (VITAMIN C) 500 mg tablet Take 500 mg by mouth.  Taking half tablet daily       Allergies   Allergen Reactions    Aleve [Naproxen Sodium] Other (comments)     Welts on skin     Iodine Other (comments)     Loss of voice, burning sensations  No IVP dye     Pcn [Penicillins] Hives    Sulfa (Sulfonamide Antibiotics) Other (comments)     Bruising     Tetracycline Unknown (comments)    Tylenol [Acetaminophen] Hives     Hands were itchy and red. Family History   Problem Relation Age of Onset    Thyroid Disease Mother     Asthma Mother     Cancer Father      prostate    Stroke Maternal Aunt     Stroke Maternal Grandfather     Stroke Paternal Grandfather     Diabetes Neg Hx     Elevated Lipids Neg Hx     Heart Disease Neg Hx     Hypertension Neg Hx      Social History   Substance Use Topics    Smoking status: Never Smoker    Smokeless tobacco: Never Used    Alcohol use No     Patient Active Problem List   Diagnosis Code    Hypothyroid E03.9    AR (allergic rhinitis) J30.9    Herpes simplex labialis B00.1    Hyperlipidemia with target LDL less than 130 E78.5    Anxiety F41.9    Osteoporosis M81.0    Dysplastic colon polyp K63.5    Advanced care planning/counseling discussion Z71.89       Depression Risk Factor Screening:   Patient denies feelings of being down, depressed or hopeless at this time. Patient states that they have a strong support system within their family & friends. PHQ over the last two weeks 6/12/2018   Little interest or pleasure in doing things Not at all   Feeling down, depressed or hopeless Not at all   Total Score PHQ 2 0     Alcohol Risk Factor Screening: You do not drink alcohol or very rarely. Functional Ability and Level of Safety:   Hearing Loss  Hearing is good. Activities of Daily Living  The home contains: no safety equipment. Patient does total self care   Patient states that she lives with her  in a private residence. Patient states independence in all ADLs & denies the use of assistive devices for ambulation. NN encouraged patient to continue and/ or introduce routine physical exercise into their daily routine as applicable & as recommended by PCP. Patient verbalized understanding & agreement to take this into consideration.  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 ADL Assessment 6/12/2018   Feeding yourself No Help Needed   Getting from bed to chair No Help Needed   Getting dressed No Help Needed   Bathing or showering No Help Needed   Walk across the room (includes cane/walker) No Help Needed   Using the telphone No Help Needed   Taking your medications No Help Needed   Preparing meals No Help Needed   Managing money (expenses/bills) No Help Needed   Moderately strenuous housework (laundry) No Help Needed   Shopping for personal items (toiletries/medicines) No Help Needed   Shopping for groceries No Help Needed   Driving No Help Needed   Climbing a flight of stairs No Help Needed   Getting to places beyond walking distances No Help Needed     Patient denies falls within the past year & verbalizes awareness of fall prevention strategies. Fall Risk  Fall Risk Assessment, last 12 mths 6/12/2018   Able to walk? Yes   Fall in past 12 months? No       Abuse Screen  Patient is not abused   Abuse Screening Questionnaire 6/12/2018   Do you ever feel afraid of your partner? N   Are you in a relationship with someone who physically or mentally threatens you? N   Is it safe for you to go home? Y       Cognitive Screening   Evaluation of Cognitive Function:  Has your family/caregiver stated any concerns about your memory: no      Patient Care Team   Patient Care Team:  Mendel Councilman, MD as PCP - Ledy Qureshi MD (Ophthalmology)    Assessment/Plan   Education and counseling provided:  Are appropriate based on today's review and evaluation  End-of-Life planning (with patient's consent)  Pneumococcal Vaccine  Influenza Vaccine  Screening Mammography  Colorectal cancer screening tests  Bone mass measurement (DEXA)  Screening for glaucoma  tdap & shingles vaccinations    Diagnoses and all orders for this visit:    1.  Medicare annual wellness visit, subsequent    2. Hypothyroidism due to acquired atrophy of thyroid  -     TSH 3RD GENERATION    3. Hyperlipidemia with target LDL less than 130  -     LIPID PANEL    4. Age related osteoporosis, unspecified pathological fracture presence  -     denosumab (PROLIA) 60 mg/mL injection; 1 mL by SubCUTAneous route once for 1 dose. Indications: POST-MENOPAUSAL OSTEOPOROSIS    5. Vitamin D deficiency  -     VITAMIN D, 25 HYDROXY    6. Encounter for immunization  -     varicella-zoster recombinant, PF, (SHINGRIX, PF,) 50 mcg/0.5 mL susr injection; 0.5 mL by IntraMUSCular route once for 1 dose. 7. Myoclonic jerking    8. Advanced care planning/counseling discussion    AWV Summary:    1. A copy of patient's completed Advanced Medical Directive is on file in the patient's medical record. NN reviewed Advanced Medical Directive document with patient & patient denies the need for changes/ updates. 2. Patient is up to date on the following immunizations:  Immunization History   Administered Date(s) Administered    Influenza High Dose Vaccine PF 11/03/2015, 11/11/2016, 11/17/2017    Influenza Vaccine 11/05/2013, 11/13/2014    Influenza Vaccine (Whole Virus) 11/01/2012    Influenza Vaccine Split 10/15/2009    Pneumococcal Conjugate (PCV-13) 12/18/2015, 01/06/2017    Pneumococcal Vaccine (Unspecified Type) 11/08/2003    ZZZ-RETIRED (DO NOT USE) Pneumococcal Vaccine (Unspecified Type) 01/30/2003    Zoster Vaccine, Live 11/08/2008   Patient confirmed the aforementioned preventative immunization dates are correct. Patient is unable to recall the date of their last tdap vaccine. NN encouraged patient to check home records & if information obtained, to please notify PCP's office with the details. Patient verbalized agreement. Today, PCP provided patient with a Shingrix vaccination prescription & patient verbalized awareness that this vaccine can be obtained at a local pharmacy. PCP informed patient of vaccine details. Patient's health maintenance immunization record has been updated & is current. 3. Due to the patient's age, screening mammograms & screening colonoscopies are no longer indicated unless recommended by PCP or a specialist. Patient's last screening dexa scan was completed 7/2015 & a copy of the dexa scan report is on file in the patient's medical record. 4. Patient wears corrective lenses. Patient reports having a routine eye exam & glaucoma screening within the last year performed by Dr. Jg [facility] at [facility]. [facility] faxed requesting a copy of patient's last eye exam with glaucoma screening with patient's verbal approval.     Patient verbalized understanding of all information discussed. Patient was given the opportunity to ask questions. Medication reconciliation completed by MA/ LPN and reviewed by PCP. Patient provided AVS, either a printed version or electronic version in [employer], which includes Medicare Wellness Preventative Screening Table.       Health Maintenance Due   Topic Date Due    DTaP/Tdap/Td series (1 - Tdap) 06/06/1959    GLAUCOMA SCREENING Q2Y  11/10/2017

## 2018-06-12 NOTE — PATIENT INSTRUCTIONS
Medicare Part B Preventive Services Guidelines/Limitations Date last completed and Frequency Due Date   Bone Mass Measurement  (age 72 & older, biennial) Requires diagnosis related to osteoporosis or estrogen deficiency. Biennial benefit unless patient has history of long-term glucocorticoid tx or baseline is needed because initial test was by other method Completed 7/2015    Recommended every 2 years As recommended by your PCP or Specialist     Cardiovascular Screening Blood Tests (every 5 years)  Total cholesterol, HDL, Triglycerides Order as a panel if possible Completed 12/2017    As recommended by your PCP As recommended by your PCP or Specialist   Colorectal Cancer Screening  -Fecal occult blood test (annual)  -Flexible sigmoidoscopy (5y)  -Screening colonoscopy (10y)  -Barium Enema Age 49-80; After age [de-identified] if history of abnormal results As recommended by your PCP or Specialist     Recommended every 5 to 10 years  As recommended by your PCP or Specialist     Counseling to Prevent Tobacco Use (up to 8 sessions per year)  - Counseling greater than 3 and up to 10 minutes  - Counseling greater than 10 minutes Patients must be asymptomatic of tobacco-related conditions to receive as preventive service N/A N/A   Diabetes Screening Tests (at least every 3 years, Medicare covers annually or at 6-month intervals for prediabetic patients)    Fasting blood sugar (FBS) or glucose tolerance test (GTT) Patient must be diagnosed with one of the following:  -Hypertension, Dyslipidemia, obesity, previous impaired FBS or GTT  Or any two of the following: overweight, FH of diabetes, age ? 72, history of gestational diabetes, birth of baby weighing more than 9 pounds Completed 11/2013    Recommended every 3 years for non-diabetics     As recommended by your PCP or Specialist     Glaucoma Screening (no USPSTF recommendation) Diabetes mellitus, family history, , age 48 or over,  American, age 72 or over Completed within the last year    Recommended annually As recommended by your PCP or Specialist   Seasonal Influenza Vaccination (annually)  Completed 11/2017    Recommended Annually Due Fall 2018   TDAP Vaccination  As recommended by your PCP or Specialist    Recommended every 10 years As recommended by your PCP or Specialist   Zoster (Shingles) Vaccination Covered by Medicare Part D through the pharmacy- PCP provides prescription Completed 11/2008    Recommended once over age 48  Complete   Pneumococcal Vaccination (once after 72)  Pneumo 23- 11/2003  Recommended once over the age of 72    Prevnar 15- 1/2017 Recommended once over the age of 72 Complete        Complete   Screening Mammography (biennial age 54-69) Annually (age 36 or over) Completed 12/2015   As recommended by your PCP or Specialist     Screening Pap Tests and Pelvic Examination (up to age 79 and after 79 if unknown history or abnormal study last 8 years) Every 25 months except high risk As recommended by your PCP or Specialist   As recommended by your PCP or Specialist     Ultrasound Screening for Abdominal Aortic Aneurysm (AAA) (once) Patient must be referred through IPPE and not have had a screening for abdominal aortic aneurysm before under Medicare. Limited to patients who meet one of the following criteria:  - Men who are 73-68 years old and have smoked more than 100 cigarettes in their lifetime.  -Anyone with a FH of AAA  -Anyone recommended for screening by USPSTF Not indicated unless recommended by PCP   Not indicated unless recommended by PCP     Family Practice Management 2011    If you have any questions or concerns please feel free to contact me at [phone]. It was a pleasure meeting you today and participating in your healthcare.   Mehran Morales RN        Medicare Wellness Visit, Female    The best way to live healthy is to have a lifestyle where you eat a well-balanced diet, exercise regularly, limit alcohol use, and quit all forms of tobacco/nicotine, if applicable. Regular preventive services are another way to keep healthy. Preventive services (vaccines, screening tests, monitoring & exams) can help personalize your care plan, which helps you manage your own care. Screening tests can find health problems at the earliest stages, when they are easiest to treat. [facility] follows the current, evidence-based guidelines published by the [facility] Jackson ([facility]) when recommending preventive services for our patients. Because we follow these guidelines, sometimes recommendations change over time as research supports it. (For example, mammograms used to be recommended annually. Even though Medicare will still pay for an annual mammogram, the newer guidelines recommend a mammogram every two years for women of average risk.)    Of course, you and your provider may decide to screen more often for some diseases, based on your risk and co-morbidities (chronic disease you are already diagnosed with). Preventive services for you include:    - Medicare offers their members a free annual wellness visit, which is time for you and your primary care provider to discuss and plan for your preventive service needs. Take advantage of this benefit every year!    -All people over age 72 should receive the recommended pneumonia vaccines. Current USPSTF guidelines recommend a series of two vaccines for the best pneumonia protection.     -All adults should have a yearly flu vaccine and a tetanus vaccine every 10 years. All adults age 61 years should receive a shingles vaccine once in their lifetime.      -A bone mass density test is recommended when a woman turns 65 to screen for osteoporosis. This test is only recommended once as a screening. Some providers will use this same test as a disease monitoring tool if you already have osteoporosis.     -All adults age 38-68 years who are overweight should have a diabetes screening test once every three years.     -Other screening tests & preventive services for persons with diabetes include: an eye exam to screen for diabetic retinopathy, a kidney function test, a foot exam, and stricter control over your cholesterol.     -Cardiovascular screening for adults with routine risk involves an electrocardiogram (ECG) at intervals determined by the provider.     -Colorectal cancer screenings should be done for adults age 54-65 years with normal risk. There are a number of acceptable methods of screening for this type of cancer. Each test has its own benefits and drawbacks. Discuss with your provider what is most appropriate for you during your annual wellness visit. The different tests include: colonoscopy (considered the best screening method), a fecal occult blood test, a fecal DNA test, and sigmoidoscopy. -Breast cancer screenings are recommended every other year for women of normal risk age 54-69 years.     -Cervical cancer screenings for women over age 72 are only recommended with certain risk factors.     -All adults born between [place] should be screened once for Hepatitis C.      Here is a list of your current Health Maintenance items (your personalized list of preventive services) with a due date:  Health Maintenance Due   Topic Date Due    DTaP/Tdap/Td  (1 - Tdap) 06[birth year]06/1959    Glaucoma Screening   11/10/2017

## 2018-06-12 NOTE — MR AVS SNAPSHOT
Jd Cordoba 
 
 
 [address] [place] [address] [facility] 
[phone] Patient: Pasquale Sadler MRN: [id number] UUT:[birth year] Visit Information Date & Time Provider Department Dept. Phone Encounter #  
 6/12/2018  9:15 AM Ivonne Medina MD [facility] [address] [id number] Follow-up Instructions Return in about 6 months (around 12/12/2018). Upcoming Health Maintenance Date Due DTaP/Tdap/Td series (1 - Tdap) 6/6/1961 GLAUCOMA SCREENING Q2Y 11/10/2017 MEDICARE YEARLY EXAM 3/14/2018 Influenza Age 5 to Adult 8/1/2018 Allergies as of 6/12/2018  Review Complete On: 6/12/2018 By: Ivonne Medina MD  
  
 Severity Noted Reaction Type Reactions Aleve [Naproxen Sodium]  02/08/2013   Side Effect Other (comments) Welts on skin Iodine  11/04/2009    Other (comments) Loss of voice, burning sensations No IVP dye Pcn [Penicillins]  11/04/2009    Hives Sulfa (Sulfonamide Antibiotics)  11/04/2009    Other (comments) Bruising Tetracycline  07/29/2010   Intolerance Unknown (comments) Tylenol [Acetaminophen]  11/08/2013    Hives Hands were itchy and red. Current Immunizations  Reviewed on 9/27/2017 Name Date Influenza High Dose Vaccine PF 11/17/2017, 11/11/2016, 11/3/2015 Influenza Vaccine 11/13/2014, 11/5/2013 Influenza Vaccine (Whole Virus) 11/1/2012 Influenza Vaccine Split 10/15/2009 Pneumococcal Conjugate (PCV-13) 1/6/2017, 12/18/2015 Pneumococcal Vaccine (Unspecified Type) 11/8/2003 ZZZ-RETIRED (DO NOT USE) Pneumococcal Vaccine (Unspecified Type) 1/30/2003 Zoster Vaccine, Live 11/8/2008 Not reviewed this visit You Were Diagnosed With   
  
 Codes Comments Medicare annual wellness visit, subsequent    -  Primary ICD-10-CM: Z00.00 ICD-9-CM: V70.0 Hypothyroidism due to acquired atrophy of thyroid     ICD-10-CM: E03.4 ICD-9-CM: 244.8, 246.8 Hyperlipidemia with target LDL less than 130     ICD-10-CM: E78.5 ICD-9-CM: 272.4 Age related osteoporosis, unspecified pathological fracture presence     ICD-10-CM: M81.0 ICD-9-CM: 733.01 Vitamin D deficiency     ICD-10-CM: E55.9 ICD-9-CM: 268.9 Encounter for immunization     ICD-10-CM: L32 ICD-9-CM: V03.89 Vitals BP Pulse Temp Resp Height(growth percentile) Weight(growth percentile) 153/80 (BP 1 Location: Left arm, BP Patient Position: Sitting) (!) 57 97.7 °F (36.5 °C) (Oral) 18 5' 2\" (1.575 m) 106 lb 4 oz (48.2 kg) LMP SpO2 BMI OB Status Smoking Status 01/01/1988 98% 19.43 kg/m2 Hysterectomy Never Smoker Vitals History BMI and BSA Data Body Mass Index Body Surface Area  
 19.43 kg/m 2 1.45 m 2 Preferred Pharmacy Pharmacy Name Phone [facility] [phone] [facility] [phone] Your Updated Medication List  
  
   
This list is accurate as of 6/12/18  9:42 AM.  Always use your most recent med list.  
  
  
  
  
 BABY ASPIRIN PO Take  by mouth. CALCIUM ACETATE PO Take  by mouth. cetirizine 10 mg tablet Commonly known as:  ZYRTEC Take 10 mg by mouth daily as needed. denosumab 60 mg/mL injection Commonly known as:  Flory Vogel 1 mL by SubCUTAneous route once for 1 dose. Indications: POST-MENOPAUSAL OSTEOPOROSIS  
  
 levothyroxine 75 mcg tablet Commonly known as:  SYNTHROID  
TAKE 1 TABLET BY MOUTH 6 DAYS A WEEK  
  
 pravastatin 10 mg tablet Commonly known as:  PRAVACHOL  
TAKE 1 TAB BY MOUTH DAILY. SYSTANE (PROPYLENE GLYCOL) 0.4-0.3 % Drop Generic drug:  peg 400-propylene glycol Apply 1 Drop to eye as needed. varicella-zoster recombinant (PF) 50 mcg/0.5 mL Susr injection Commonly known as:  SHINGRIX (PF)  
0.5 mL by IntraMUSCular route once for 1 dose. VITAMIN C 500 mg tablet Generic drug:  ascorbic acid (vitamin C) Take 500 mg by mouth. Taking half tablet daily VITAMIN D3 1,000 unit tablet Generic drug:  cholecalciferol Take  by mouth daily. Prescriptions Printed Refills  
 denosumab (PROLIA) 60 mg/mL injection 0 Si mL by SubCUTAneous route once for 1 dose. Indications: POST-MENOPAUSAL OSTEOPOROSIS Class: Print Route: SubCUTAneous  
 varicella-zoster recombinant, PF, (SHINGRIX, PF,) 50 mcg/0.5 mL susr injection 0 Si.5 mL by IntraMUSCular route once for 1 dose. Class: Print Route: IntraMUSCular We Performed the Following LIPID PANEL [65604 CPT(R)] TSH 3RD GENERATION [54309 CPT(R)] VITAMIN D, 25 HYDROXY [id number] CPT(R)] Follow-up Instructions Return in about 6 months (around 2018). Introducing [facility]! [facility] introduces [employer] patient portal. Now you can access parts of your medical record, email your doctor's office, and request medication refills online. 1. In your internet browser, go to [email or web address] 2. Click on the First Time User? Click Here link in the Sign In box. You will see the New Member Sign Up page. 3. Enter your [employer] Access Code exactly as it appears below. You will not need to use this code after youve completed the sign-up process. If you do not sign up before the expiration date, you must request a new code. · [employer] Access Code: [email or web address] Expires: 9/10/2018  9:42 AM 
 
4. Enter the last four digits of your Social Security Number (xxxx) and Date of Birth (mm/dd/yyyy) as indicated and click Submit. You will be taken to the next sign-up page. 5. Create a [employer] ID. This will be your [employer] login ID and cannot be changed, so think of one that is secure and easy to remember. 6. Create a [employer] password. You can change your password at any time. 7. Enter your Password Reset Question and Answer.  This can be used at a later time if you forget your password. 8. Enter your e-mail address. You will receive e-mail notification when new information is available in [address] 9. Click Sign Up. You can now view and download portions of your medical record. 10. Click the Download Summary menu link to download a portable copy of your medical information. If you have questions, please visit the Frequently Asked Questions section of the [employer] website. Remember, [employer] is NOT to be used for urgent needs. For medical emergencies, dial 911. Now available from your iPhone and Android! Please provide this summary of care documentation to your next provider. Your primary care clinician is listed as Suzy Camp. If you have any questions after today's visit, please call [phone].

## 2018-06-12 NOTE — PROGRESS NOTES
HISTORY OF PRESENT ILLNESS  Mary Renae is a [de-identified] y.o. female. HPI  Thyroid Disease:  Mary Renae is a [de-identified] y.o. female here for follow up of hypothyroidism. Lab Results   Component Value Date/Time    TSH 1.370 12/05/2017 08:39 AM     Residual symptoms denies fatigue, weight changes, heat/cold intolerance, bowel/skin changes or CVS symptoms. she denies denies fatigue, weight changes, heat/cold intolerance, bowel/skin changes or CVS symptoms  Thyroid medication has been unchanged since last medication check and labs. Hyperlipidemia:  Mary Renae is following up on her dyslipidemia. Cardiovascular risks for her are: LDL goal is under 100. Currently she takes Pravachol (pravastatin) ,   Lab Results   Component Value Date/Time    Cholesterol, total 139 12/05/2017 08:39 AM    Cholesterol, total 160 12/06/2016 09:43 AM    Cholesterol, total 166 12/08/2015 09:23 AM    Cholesterol, total 200 (H) 12/04/2014 08:27 AM    Cholesterol, total 163 05/08/2014 08:17 AM    HDL Cholesterol 37 (L) 12/05/2017 08:39 AM    HDL Cholesterol 51 12/06/2016 09:43 AM    HDL Cholesterol 51 12/08/2015 09:23 AM    HDL Cholesterol 49 12/04/2014 08:27 AM    HDL Cholesterol 48 05/08/2014 08:17 AM    LDL, calculated 84 12/05/2017 08:39 AM    LDL, calculated 90 12/06/2016 09:43 AM    LDL, calculated 102 (H) 12/08/2015 09:23 AM    LDL, calculated 123 (H) 12/04/2014 08:27 AM    LDL, calculated 96 05/08/2014 08:17 AM    Triglyceride 92 12/05/2017 08:39 AM    Triglyceride 97 12/06/2016 09:43 AM    Triglyceride 63 12/08/2015 09:23 AM    Triglyceride 138 12/04/2014 08:27 AM    Triglyceride 97 05/08/2014 08:17 AM    CHOL/HDL Ratio 4.8 05/05/2010 10:10 AM    CHOL/HDL Ratio 4.2 05/14/2009 08:40 AM     Lab Results   Component Value Date/Time    ALT (SGPT) 19 05/09/2013 12:00 AM    AST (SGOT) 20 05/09/2013 12:00 AM    Alk.  phosphatase 84 05/09/2013 12:00 AM    Bilirubin, direct 0.23 05/09/2013 12:00 AM    Bilirubin, total 1.0 05/09/2013 12:00 AM Myalgias: no  Fatigue: no      She reports almost daily bilateral upper thigh, hip muscle jerk x 1 while relaxing in chair. No back pain, weakness, numbness, trouble walking. No other symptoms. Patient Active Problem List   Diagnosis Code    Hypothyroid E03.9    AR (allergic rhinitis) J30.9    Herpes simplex labialis B00.1    Hyperlipidemia with target LDL less than 130 E78.5    Anxiety F41.9    Osteoporosis M81.0    Dysplastic colon polyp K63.5    Advanced care planning/counseling discussion Z71.89     Past Medical History:   Diagnosis Date    AR (allergic rhinitis) 5/5/2010    Colon polyp 7/12/2010    Hyperlipidemia LDL goal < 130 2/6/2012    Hypothyroid 5/12/2009    Osteopenia 5/5/2010    Thyroid disease     Ureter filling defect     needed surgery     Allergies   Allergen Reactions    Aleve [Naproxen Sodium] Other (comments)     Welts on skin     Iodine Other (comments)     Loss of voice, burning sensations  No IVP dye     Pcn [Penicillins] Hives    Sulfa (Sulfonamide Antibiotics) Other (comments)     Bruising     Tetracycline Unknown (comments)    Tylenol [Acetaminophen] Hives     Hands were itchy and red. Current Outpatient Prescriptions on File Prior to Visit   Medication Sig Dispense Refill    levothyroxine (SYNTHROID) 75 mcg tablet TAKE 1 TABLET BY MOUTH 6 DAYS A WEEK 80 Tab 2    pravastatin (PRAVACHOL) 10 mg tablet TAKE 1 TAB BY MOUTH DAILY. 90 Tab 1    BABY ASPIRIN PO Take  by mouth.  CALCIUM ACETATE PO Take  by mouth.  cetirizine (ZYRTEC) 10 mg tablet Take 10 mg by mouth daily as needed. 11    cholecalciferol (VITAMIN D3) 1,000 unit tablet Take  by mouth daily.  peg 400-propylene glycol (SYSTANE) 0.4-0.3 % Drop Apply 1 Drop to eye as needed.  ascorbic acid (VITAMIN C) 500 mg tablet Take 500 mg by mouth. Taking half tablet daily       No current facility-administered medications on file prior to visit.       Social History   Substance Use Topics  Smoking status: Never Smoker    Smokeless tobacco: Never Used    Alcohol use No               ROS    Physical Exam   Constitutional: She appears well-developed and well-nourished. No distress. /78  Pulse (!) 57  Temp 97.7 °F (36.5 °C) (Oral)   Resp 18  Ht 5' 2\" (1.575 m)  Wt 106 lb 4 oz (48.2 kg)  LMP 01/01/1988  SpO2 98%  BMI 19.43 kg/m2Body mass index is 19.43 kg/(m^2). HENT:   Mouth/Throat: Oropharynx is clear and moist.   Neck: No JVD present. Carotid bruit is not present. Cardiovascular: Normal rate, regular rhythm, normal heart sounds and intact distal pulses. Pulmonary/Chest: Effort normal and breath sounds normal.   Musculoskeletal: She exhibits no edema. Neurological: She is alert. Skin: Skin is warm and dry. She is not diaphoretic. Nursing note and vitals reviewed. ASSESSMENT and PLAN  Diagnoses and all orders for this visit:    1. Medicare annual wellness visit, subsequent  Lizzie Barron received a complete medicare wellness assessment today by Jarrett Faria RN. I've reviewed her assessment note and all screening/preventative plans addressed. I also addressed all questions and concerns surrounding the assessment and plans with Lizzie Anderson. 2. Hypothyroidism due to acquired atrophy of thyroid -recheck  -     TSH 3RD GENERATION    3. Hyperlipidemia with target LDL less than 130 - recheck HDL  -     LIPID PANEL    4. Age related osteoporosis, unspecified pathological fracture presence - we discussed other alternative treatments including prolia injections. She would like to consider and get back to me. -     denosumab (PROLIA) 60 mg/mL injection; 1 mL by SubCUTAneous route once for 1 dose. Indications: POST-MENOPAUSAL OSTEOPOROSIS    5. Vitamin D deficiency  -     VITAMIN D, 25 HYDROXY    6.  Encounter for immunization  -     varicella-zoster recombinant, PF, (SHINGRIX, PF,) 50 mcg/0.5 mL susr injection; 0.5 mL by IntraMUSCular route once for 1 dose.    7. Myoclonic jerking - rare with no other symptoms. Observe and notify me if worsening. Follow-up Disposition:  Return in about 6 months (around 12/12/2018).

## 2018-06-13 LAB
25(OH)D3+25(OH)D2 SERPL-MCNC: 61.8 NG/ML (ref 30–100)
CHOLEST SERPL-MCNC: 165 MG/DL (ref 100–199)
HDLC SERPL-MCNC: 50 MG/DL
INTERPRETATION, 910389: NORMAL
LDLC SERPL CALC-MCNC: 97 MG/DL (ref 0–99)
TRIGL SERPL-MCNC: 92 MG/DL (ref 0–149)
TSH SERPL DL<=0.005 MIU/L-ACNC: 2.3 UIU/ML (ref 0.45–4.5)
VLDLC SERPL CALC-MCNC: 18 MG/DL (ref 5–40)

## 2018-08-07 DIAGNOSIS — E78.5 HYPERLIPIDEMIA WITH TARGET LDL LESS THAN 130: ICD-10-CM

## 2018-08-07 RX ORDER — PRAVASTATIN SODIUM 10 MG/1
TABLET ORAL
Qty: 90 TAB | Refills: 1 | Status: SHIPPED | OUTPATIENT
Start: 2018-08-07 | End: 2019-02-19 | Stop reason: SDUPTHER

## 2018-10-09 RX ORDER — LEVOTHYROXINE SODIUM 75 UG/1
TABLET ORAL
Qty: 80 TAB | Refills: 2 | Status: SHIPPED | OUTPATIENT
Start: 2018-10-09

## 2018-12-18 ENCOUNTER — OFFICE VISIT (OUTPATIENT)
Dept: INTERNAL MEDICINE CLINIC | Age: 78
End: 2018-12-18

## 2018-12-18 VITALS
TEMPERATURE: 98 F | WEIGHT: 107 LBS | BODY MASS INDEX: 19.69 KG/M2 | OXYGEN SATURATION: 100 % | SYSTOLIC BLOOD PRESSURE: 140 MMHG | DIASTOLIC BLOOD PRESSURE: 85 MMHG | RESPIRATION RATE: 18 BRPM | HEART RATE: 60 BPM | HEIGHT: 62 IN

## 2018-12-18 DIAGNOSIS — F41.9 ANXIETY: ICD-10-CM

## 2018-12-18 DIAGNOSIS — M81.0 AGE-RELATED OSTEOPOROSIS WITHOUT CURRENT PATHOLOGICAL FRACTURE: ICD-10-CM

## 2018-12-18 DIAGNOSIS — E78.5 HYPERLIPIDEMIA WITH TARGET LDL LESS THAN 130: ICD-10-CM

## 2018-12-18 DIAGNOSIS — E03.4 HYPOTHYROIDISM DUE TO ACQUIRED ATROPHY OF THYROID: Primary | ICD-10-CM

## 2018-12-18 NOTE — PROGRESS NOTES
HISTORY OF PRESENT ILLNESS  Trish Conklin is a [de-identified] y.o. female. HPI  Thyroid Disease:  Trish Conklin is a [de-identified] y.o. female here for follow up of hypothyroidism. Lab Results   Component Value Date/Time    TSH 2.300 06/12/2018 09:50 AM     Residual symptoms denies fatigue, weight changes, heat/cold intolerance, bowel/skin changes or CVS symptoms. she denies denies fatigue, weight changes, heat/cold intolerance, bowel/skin changes or CVS symptoms  Thyroid medication has been unchanged since last medication check and labs. Hyperlipidemia:  Trish Conklin is following up on her dyslipidemia. Cardiovascular risks for her are: LDL goal is under 100. Currently she takes Pravachol (pravastatin) ,   Lab Results   Component Value Date/Time    Cholesterol, total 165 06/12/2018 09:50 AM    Cholesterol, total 139 12/05/2017 08:39 AM    Cholesterol, total 160 12/06/2016 09:43 AM    Cholesterol, total 166 12/08/2015 09:23 AM    Cholesterol, total 200 (H) 12/04/2014 08:27 AM    HDL Cholesterol 50 06/12/2018 09:50 AM    HDL Cholesterol 37 (L) 12/05/2017 08:39 AM    HDL Cholesterol 51 12/06/2016 09:43 AM    HDL Cholesterol 51 12/08/2015 09:23 AM    HDL Cholesterol 49 12/04/2014 08:27 AM    LDL, calculated 97 06/12/2018 09:50 AM    LDL, calculated 84 12/05/2017 08:39 AM    LDL, calculated 90 12/06/2016 09:43 AM    LDL, calculated 102 (H) 12/08/2015 09:23 AM    LDL, calculated 123 (H) 12/04/2014 08:27 AM    Triglyceride 92 06/12/2018 09:50 AM    Triglyceride 92 12/05/2017 08:39 AM    Triglyceride 97 12/06/2016 09:43 AM    Triglyceride 63 12/08/2015 09:23 AM    Triglyceride 138 12/04/2014 08:27 AM    CHOL/HDL Ratio 4.8 05/05/2010 10:10 AM    CHOL/HDL Ratio 4.2 05/14/2009 08:40 AM     Lab Results   Component Value Date/Time    ALT (SGPT) 19 05/09/2013 12:00 AM    AST (SGOT) 20 05/09/2013 12:00 AM    Alk.  phosphatase 84 05/09/2013 12:00 AM    Bilirubin, direct 0.23 05/09/2013 12:00 AM    Bilirubin, total 1.0 05/09/2013 12:00 AM Myalgias: no  Fatigue: no    We ordered prolia at last visit but not yet started. \    Lab Results   Component Value Date/Time    VITAMIN D, 25-HYDROXY 61.8 06/12/2018 09:50 AM           Patient Active Problem List   Diagnosis Code    Hypothyroid E03.9    AR (allergic rhinitis) J30.9    Herpes simplex labialis B00.1    Hyperlipidemia with target LDL less than 130 E78.5    Anxiety F41.9    Osteoporosis M81.0    Dysplastic colon polyp K63.5    Advanced care planning/counseling discussion Z71.89     Past Medical History:   Diagnosis Date    AR (allergic rhinitis) 5/5/2010    Colon polyp 7/12/2010    Hyperlipidemia LDL goal < 130 2/6/2012    Hypothyroid 5/12/2009    Osteopenia 5/5/2010    Thyroid disease     Ureter filling defect     needed surgery     Allergies   Allergen Reactions    Aleve [Naproxen Sodium] Other (comments)     Welts on skin     Iodine Other (comments)     Loss of voice, burning sensations  No IVP dye     Pcn [Penicillins] Hives    Sulfa (Sulfonamide Antibiotics) Other (comments)     Bruising     Tetracycline Unknown (comments)    Tylenol [Acetaminophen] Hives     Hands were itchy and red. Current Outpatient Medications on File Prior to Visit   Medication Sig Dispense Refill    levothyroxine (SYNTHROID) 75 mcg tablet TAKE 1 TABLET BY MOUTH 6 DAYS A WEEK 80 Tab 2    pravastatin (PRAVACHOL) 10 mg tablet TAKE 1 TABLET BY MOUTH ONCE DAILY 90 Tab 1    BABY ASPIRIN PO Take  by mouth.  CALCIUM ACETATE PO Take  by mouth as needed.  cetirizine (ZYRTEC) 10 mg tablet Take 10 mg by mouth daily as needed. 11    cholecalciferol (VITAMIN D3) 1,000 unit tablet Take  by mouth daily.  peg 400-propylene glycol (SYSTANE) 0.4-0.3 % Drop Apply 1 Drop to eye as needed.  ascorbic acid (VITAMIN C) 500 mg tablet Take 500 mg by mouth. Taking half tablet daily       No current facility-administered medications on file prior to visit.       Social History     Tobacco Use    Smoking status: Never Smoker    Smokeless tobacco: Never Used   Substance Use Topics    Alcohol use: No    Drug use: No           ROS    Physical Exam   Constitutional: She appears well-developed and well-nourished. No distress. /85   Pulse 60   Temp 98 °F (36.7 °C) (Oral)   Resp 18   Ht 5' 2\" (1.575 m)   Wt 107 lb (48.5 kg)   LMP 01/01/1988   SpO2 100%   BMI 19.57 kg/m² Body mass index is 19.57 kg/m². HENT:   Mouth/Throat: Oropharynx is clear and moist.   Neck: No JVD present. Carotid bruit is not present. No thyromegaly present. Cardiovascular: Normal rate, regular rhythm, normal heart sounds and intact distal pulses. Pulmonary/Chest: Effort normal and breath sounds normal.   Musculoskeletal: She exhibits no edema. Neurological: She is alert. Skin: Skin is warm and dry. She is not diaphoretic. Nursing note and vitals reviewed. ASSESSMENT and PLAN  Diagnoses and all orders for this visit:    1. Hypothyroidism due to acquired atrophy of thyroid - Well controlled and stable. her medications were reviewed and refilled where necessary as noted below. Labs ordered as noted. 2. Age-related osteoporosis without current pathological fracture -we discussed starting prolia now again. Will order thru [facility]  -     denosumab (PROLIA) 60 mg/mL injection; 1 mL by SubCUTAneous route once for 1 dose. 3. Hyperlipidemia with target LDL less than 130 -Well controlled and stable. her medications were reviewed and refilled where necessary as noted below. Labs ordered as noted. 4. Anxiety -Well controlled and stable. her medications were reviewed and refilled where necessary as noted below. Labs ordered as noted. Follow-up Disposition:  Return in about 6 months (around 6/18/2019).

## 2019-01-15 ENCOUNTER — HOSPITAL ENCOUNTER (OUTPATIENT)
Dept: INFUSION THERAPY | Age: 79
Discharge: HOME | End: 2019-01-15
Payer: MEDICARE

## 2019-01-15 VITALS
RESPIRATION RATE: 16 BRPM | DIASTOLIC BLOOD PRESSURE: 82 MMHG | HEART RATE: 71 BPM | TEMPERATURE: 96.9 F | SYSTOLIC BLOOD PRESSURE: 159 MMHG

## 2019-01-15 LAB
ANION GAP BLD CALC-SCNC: 15 MMOL/L (ref 10–20)
BUN BLD-MCNC: 13 MG/DL (ref 9–20)
CA-I BLD-MCNC: 1.14 MMOL/L (ref 1.12–1.32)
CHLORIDE BLD-SCNC: 104 MMOL/L (ref 98–107)
CO2 BLD-SCNC: 28 MMOL/L (ref 21–32)
CREAT BLD-MCNC: 0.9 MG/DL (ref 0.6–1.3)
GLUCOSE BLD-MCNC: 92 MG/DL (ref 65–100)
HCT VFR BLD CALC: 35 % (ref 35–47)
MAGNESIUM SERPL-MCNC: 2 MG/DL (ref 1.6–2.4)
PHOSPHATE SERPL-MCNC: 3.1 MG/DL (ref 2.6–4.7)
POTASSIUM BLD-SCNC: 4 MMOL/L (ref 3.5–5.1)
SERVICE CMNT-IMP: NORMAL
SODIUM BLD-SCNC: 143 MMOL/L (ref 136–145)

## 2019-01-15 PROCEDURE — 36415 COLL VENOUS BLD VENIPUNCTURE: CPT

## 2019-01-15 PROCEDURE — 84100 ASSAY OF PHOSPHORUS: CPT

## 2019-01-15 PROCEDURE — 83735 ASSAY OF MAGNESIUM: CPT

## 2019-01-15 PROCEDURE — 74011250636 HC RX REV CODE- 250/636: Performed by: INTERNAL MEDICINE

## 2019-01-15 PROCEDURE — 80047 BASIC METABLC PNL IONIZED CA: CPT

## 2019-01-15 PROCEDURE — 96372 THER/PROPH/DIAG INJ SC/IM: CPT

## 2019-01-15 RX ORDER — RANITIDINE HCL 75 MG
75 TABLET ORAL ONCE
COMMUNITY

## 2019-01-15 RX ADMIN — DENOSUMAB 60 MG: 60 INJECTION SUBCUTANEOUS at 11:58

## 2019-01-15 NOTE — PROGRESS NOTES
Outpatient Infusion Center Progress Note [phone] Pt admit to [facility] for Prolia ambulatory in stable condition. Assessment completed. No new concerns voiced. 1st dose education provided. Labs drawn and sent for processing. Visit Vitals /82 Pulse 71 Temp 96.9 °F (36.1 °C) Resp 16 LMP 01/01/1988 Breastfeeding? No  
 
 
Medications: 
Prolia SQ left arm  
 
1200 Pt tolerated treatment well. D/c home ambulatory in no distress. Pt aware of next appointment scheduled for 7/16/19. Recent Results (from the past 12 hour(s)) MAGNESIUM Collection Time: 01/15/19 11:36 AM  
Result Value Ref Range Magnesium 2.0 1.6 - 2.4 mg/dL POC CHEM8 Collection Time: 01/15/19 11:38 AM  
Result Value Ref Range Calcium, ionized (POC) 1.14 1.12 - 1.32 mmol/L Sodium (POC) 143 136 - 145 mmol/L Potassium (POC) 4.0 3.5 - 5.1 mmol/L Chloride (POC) 104 98 - 107 mmol/L  
 CO2 (POC) 28 21 - 32 mmol/L Anion gap (POC) 15 10 - 20 mmol/L Glucose (POC) 92 65 - 100 mg/dL BUN (POC) 13 9 - 20 mg/dL Creatinine (POC) 0.9 0.6 - 1.3 mg/dL GFRAA, POC >60 >60 ml/min/1.73m2 GFRNA, POC >60 >60 ml/min/1.73m2 Hematocrit (POC) 35 35.0 - 47.0 % Comment Comment Not Indicated.

## 2019-02-19 DIAGNOSIS — E78.5 HYPERLIPIDEMIA WITH TARGET LDL LESS THAN 130: ICD-10-CM

## 2019-02-20 RX ORDER — PRAVASTATIN SODIUM 10 MG/1
TABLET ORAL
Qty: 90 TAB | Refills: 1 | Status: SHIPPED | OUTPATIENT
Start: 2019-02-20

## 2019-02-20 NOTE — TELEPHONE ENCOUNTER
Last visit noted, labs checked and refill deemed appropriate at this time. Verbal refill order authorized by covering physicians at [facility] for Dr. Mary Cabral during his absence.     Nikunj Randle, CheryD, BCPS, CDE

## 2019-05-14 ENCOUNTER — TELEPHONE (OUTPATIENT)
Dept: INTERNAL MEDICINE CLINIC | Age: 79
End: 2019-05-14

## 2019-05-14 NOTE — TELEPHONE ENCOUNTER
I spoke with pt, she would like [facility] recommendations for her and her . She wants to stay with [address] [place] [address]. Information provided and pt will call back once she schedules an appt to update her and her 's chart.

## 2019-05-14 NOTE — TELEPHONE ENCOUNTER
Patient is returning a call to the nurse , states she has an apt with Dr MARCOS WINKLER in June and states she will speak to him at that moment to discuss a different doctor , states she hasn't been able to .  Requesting to speak with Connor \" since she called\" , she can be reached at [phone]

## 2019-05-16 ENCOUNTER — TELEPHONE (OUTPATIENT)
Dept: INTERNAL MEDICINE CLINIC | Age: 79
End: 2019-05-16

## 2019-05-16 NOTE — TELEPHONE ENCOUNTER
I spoke with patient, she said Dr. Marc Lynne isn't a PCP. I provided her with multiple practices that have internist in the [place] area. Pt will do research on each practice/provider and make an appt. She will call back with her new appt information. I told pt if she needs help scheduling an appt to please call me back and I will help.

## 2019-05-16 NOTE — TELEPHONE ENCOUNTER
----- Message from Rissa Villatoro sent at 5/16/2019 10:57 AM EDT -----  Regarding: Dr. Anahy Vernon   Pt is requesting a call back from Abi Castillo regarding a new PCP. Best contact is [phone].

## 2019-05-16 NOTE — TELEPHONE ENCOUNTER
Pt called to state she found at new PCP Dr. Ashley May, I advise that is an endocrinologist. She states that she told her that Dr. Ashley May office that she needs a pcp, they said she could schedule an appt. I advise that pt call back to confirm dr. Rina Sánchez will also be her pcp and endocrinologist, she will call them and then call our office back.

## 2019-05-22 ENCOUNTER — TELEPHONE (OUTPATIENT)
Dept: INTERNAL MEDICINE CLINIC | Age: 79
End: 2019-05-22

## 2019-05-22 NOTE — TELEPHONE ENCOUNTER
Marybel ABEBE [facility] Front Office Pool         Pt would like to speak to [facility] regarding switching pcp's. Best contact number is [phone].

## 2019-05-22 NOTE — TELEPHONE ENCOUNTER
Pt called to state her new pcp is Dr. Cote Fears her new patient appt is July 23rd. Her  Mr. Satya Corral is seeing Dr. Nathen Singh. They will send over a medical record release form. She wanted to know if she should cancel her Prolia infusion Dr. Aurelio Maher orders. I advise that she check with her new PCP prior to cancelling her infusion in July. Pt verbalized understanding and was thankful for the help.

## 2019-07-16 ENCOUNTER — APPOINTMENT (OUTPATIENT)
Dept: INFUSION THERAPY | Age: 79
End: 2019-07-16